# Patient Record
Sex: FEMALE | Race: WHITE | HISPANIC OR LATINO | URBAN - METROPOLITAN AREA
[De-identification: names, ages, dates, MRNs, and addresses within clinical notes are randomized per-mention and may not be internally consistent; named-entity substitution may affect disease eponyms.]

---

## 2021-11-09 ENCOUNTER — OFFICE VISIT (OUTPATIENT)
Dept: URBAN - METROPOLITAN AREA CLINIC 121 | Facility: CLINIC | Age: 71
End: 2021-11-09

## 2021-11-29 ENCOUNTER — OFFICE VISIT (OUTPATIENT)
Dept: URBAN - METROPOLITAN AREA CLINIC 63 | Facility: CLINIC | Age: 71
End: 2021-11-29

## 2021-12-06 ENCOUNTER — OFFICE VISIT (OUTPATIENT)
Dept: URBAN - METROPOLITAN AREA CLINIC 63 | Facility: CLINIC | Age: 71
End: 2021-12-06

## 2021-12-06 ENCOUNTER — OFFICE VISIT (OUTPATIENT)
Dept: URBAN - METROPOLITAN AREA CLINIC 60 | Facility: CLINIC | Age: 71
End: 2021-12-06

## 2022-01-14 ENCOUNTER — OFFICE VISIT (OUTPATIENT)
Dept: URBAN - METROPOLITAN AREA CLINIC 63 | Facility: CLINIC | Age: 72
End: 2022-01-14

## 2022-02-18 ENCOUNTER — OFFICE VISIT (OUTPATIENT)
Dept: URBAN - METROPOLITAN AREA SURGERY CENTER 4 | Facility: SURGERY CENTER | Age: 72
End: 2022-02-18

## 2022-03-22 ENCOUNTER — OFFICE VISIT (OUTPATIENT)
Dept: URBAN - METROPOLITAN AREA SURGERY CENTER 4 | Facility: SURGERY CENTER | Age: 72
End: 2022-03-22

## 2022-05-06 ENCOUNTER — OFFICE VISIT (OUTPATIENT)
Dept: URBAN - METROPOLITAN AREA SURGERY CENTER 4 | Facility: SURGERY CENTER | Age: 72
End: 2022-05-06

## 2022-06-03 ENCOUNTER — OFFICE VISIT (OUTPATIENT)
Dept: URBAN - METROPOLITAN AREA SURGERY CENTER 4 | Facility: SURGERY CENTER | Age: 72
End: 2022-06-03

## 2022-06-07 ENCOUNTER — OFFICE VISIT (OUTPATIENT)
Dept: URBAN - METROPOLITAN AREA MEDICAL CENTER 14 | Facility: MEDICAL CENTER | Age: 72
End: 2022-06-07

## 2022-06-07 ENCOUNTER — OFFICE VISIT (OUTPATIENT)
Dept: URBAN - METROPOLITAN AREA SURGERY CENTER 4 | Facility: SURGERY CENTER | Age: 72
End: 2022-06-07

## 2022-07-09 ENCOUNTER — TELEPHONE ENCOUNTER (OUTPATIENT)
Dept: URBAN - METROPOLITAN AREA CLINIC 121 | Facility: CLINIC | Age: 72
End: 2022-07-09

## 2022-07-09 RX ORDER — ATORVASTATIN CALCIUM 80 MG/1
TABLET, FILM COATED ORAL
Refills: 0 | OUTPATIENT
Start: 2021-12-06 | End: 2022-01-14

## 2022-07-09 RX ORDER — LISINOPRIL 2.5 MG/1
TABLET ORAL
Refills: 0 | OUTPATIENT
Start: 2021-12-06 | End: 2022-01-14

## 2022-07-09 RX ORDER — PANTOPRAZOLE SODIUM 40 MG/1
TABLET, DELAYED RELEASE ORAL
Refills: 0 | OUTPATIENT
Start: 2021-12-06 | End: 2022-01-14

## 2022-07-09 RX ORDER — RIVAROXABAN 2.5 MG/1
TABLET, FILM COATED ORAL TWICE A DAY
Refills: 0 | OUTPATIENT
Start: 2021-12-06 | End: 2022-01-14

## 2022-07-09 RX ORDER — PIOGLITAZONE 15 MG/1
TABLET ORAL
Refills: 0 | OUTPATIENT
Start: 2021-12-06 | End: 2022-01-14

## 2022-07-09 RX ORDER — MECLIZINE HYDROCHLORIDE 25 MG/1
TABLET ORAL TWICE A DAY
Refills: 0 | OUTPATIENT
Start: 2021-12-06 | End: 2022-01-14

## 2022-07-10 ENCOUNTER — TELEPHONE ENCOUNTER (OUTPATIENT)
Dept: URBAN - METROPOLITAN AREA CLINIC 121 | Facility: CLINIC | Age: 72
End: 2022-07-10

## 2022-07-10 RX ORDER — ONDANSETRON HYDROCHLORIDE 4 MG/1
TAKE ONE TABLET PO 30 MINS BEFORE COLONOSCOPY PREP AND ONE TABLET PO 6 HOURS INTO PREP TABLET, FILM COATED ORAL
Refills: 0 | Status: ACTIVE | COMMUNITY
Start: 2022-02-18

## 2022-07-10 RX ORDER — ATORVASTATIN CALCIUM 80 MG/1
TABLET, FILM COATED ORAL
Refills: 0 | Status: ACTIVE | COMMUNITY
Start: 2022-01-14

## 2022-07-10 RX ORDER — MECLIZINE HYDROCHLORIDE 25 MG/1
TABLET ORAL TWICE A DAY
Refills: 0 | Status: ACTIVE | COMMUNITY
Start: 2022-01-14

## 2022-07-10 RX ORDER — POLYETHYLENE GLYCOL 3350, SODIUM SULFATE, SODIUM CHLORIDE, POTASSIUM CHLORIDE, ASCORBIC ACID, SODIUM ASCORBATE 140-9-5.2G
USE AS DIRECTED KIT ORAL
Refills: 0 | Status: ACTIVE | COMMUNITY
Start: 2022-03-10

## 2022-07-10 RX ORDER — PANTOPRAZOLE SODIUM 40 MG/1
TABLET, DELAYED RELEASE ORAL
Refills: 0 | Status: ACTIVE | COMMUNITY
Start: 2022-01-14

## 2022-07-10 RX ORDER — ONDANSETRON HYDROCHLORIDE 4 MG/1
USE AS DIRECTED TAKE ONE TABLET 30 MINUTES BEFORE EACH BOTTLE OF MAGNESIUM CITRATE FOR COLONOSCOPY PREP TABLET, FILM COATED ORAL
Refills: 0 | Status: ACTIVE | COMMUNITY
Start: 2022-06-03

## 2022-07-10 RX ORDER — RIVAROXABAN 2.5 MG/1
TABLET, FILM COATED ORAL TWICE A DAY
Refills: 0 | Status: ACTIVE | COMMUNITY
Start: 2022-01-14

## 2022-07-10 RX ORDER — PIOGLITAZONE 15 MG/1
TABLET ORAL
Refills: 0 | Status: ACTIVE | COMMUNITY
Start: 2022-01-14

## 2022-07-10 RX ORDER — LISINOPRIL 2.5 MG/1
TABLET ORAL
Refills: 0 | Status: ACTIVE | COMMUNITY
Start: 2022-01-14

## 2022-07-14 ENCOUNTER — OFFICE VISIT (OUTPATIENT)
Dept: URBAN - METROPOLITAN AREA CLINIC 63 | Facility: CLINIC | Age: 72
End: 2022-07-14

## 2022-08-07 PROBLEM — 302914006: Status: ACTIVE | Noted: 2022-08-07

## 2022-08-08 ENCOUNTER — DASHBOARD ENCOUNTERS (OUTPATIENT)
Age: 72
End: 2022-08-08

## 2022-08-08 ENCOUNTER — LAB OUTSIDE AN ENCOUNTER (OUTPATIENT)
Dept: URBAN - METROPOLITAN AREA CLINIC 60 | Facility: CLINIC | Age: 72
End: 2022-08-08

## 2022-08-08 ENCOUNTER — OFFICE VISIT (OUTPATIENT)
Dept: URBAN - METROPOLITAN AREA CLINIC 60 | Facility: CLINIC | Age: 72
End: 2022-08-08
Payer: COMMERCIAL

## 2022-08-08 ENCOUNTER — WEB ENCOUNTER (OUTPATIENT)
Dept: URBAN - METROPOLITAN AREA CLINIC 60 | Facility: CLINIC | Age: 72
End: 2022-08-08

## 2022-08-08 VITALS
RESPIRATION RATE: 20 BRPM | BODY MASS INDEX: 28.32 KG/M2 | SYSTOLIC BLOOD PRESSURE: 120 MMHG | DIASTOLIC BLOOD PRESSURE: 78 MMHG | HEIGHT: 61 IN | OXYGEN SATURATION: 98 % | HEART RATE: 89 BPM | WEIGHT: 150 LBS | TEMPERATURE: 97.5 F

## 2022-08-08 DIAGNOSIS — R93.2 ABNORMAL LIVER CT: ICD-10-CM

## 2022-08-08 DIAGNOSIS — B37.81 CANDIDIASIS OF ESOPHAGUS: ICD-10-CM

## 2022-08-08 DIAGNOSIS — Z86.010 PERSONAL HISTORY OF COLONIC POLYPS: ICD-10-CM

## 2022-08-08 DIAGNOSIS — Z80.0 FAMILY HISTORY OF COLON CANCER: ICD-10-CM

## 2022-08-08 DIAGNOSIS — D21.4 SUBMUCOSAL LEIOMYOMA OF COLON: ICD-10-CM

## 2022-08-08 PROCEDURE — 99214 OFFICE O/P EST MOD 30 MIN: CPT | Performed by: PHYSICIAN ASSISTANT

## 2022-08-08 RX ORDER — FLUTICASONE PROPIONATE AND SALMETEROL XINAFOATE 230; 21 UG/1; UG/1
AEROSOL, METERED RESPIRATORY (INHALATION)
Qty: 12 GRAM | Status: ACTIVE | COMMUNITY

## 2022-08-08 RX ORDER — DICLOFENAC SODIUM TOPICAL GEL, 1%, 10 MG/G
GEL TOPICAL
Qty: 300 GRAM | Status: ACTIVE | COMMUNITY

## 2022-08-08 RX ORDER — DONEPEZIL HYDROCHLORIDE 5 MG/1
TAKE 1 TABLET BY MOUTH AT BEDTIME TABLET, FILM COATED ORAL
Qty: 30 EACH | Refills: 1 | Status: ACTIVE | COMMUNITY

## 2022-08-08 RX ORDER — BLOOD-GLUCOSE METER
EACH MISCELLANEOUS
Qty: 100 EACH | Status: ACTIVE | COMMUNITY

## 2022-08-08 RX ORDER — PANTOPRAZOLE SODIUM 40 MG/1
TABLET, DELAYED RELEASE ORAL
Refills: 0 | Status: ACTIVE | COMMUNITY
Start: 2022-01-14

## 2022-08-08 RX ORDER — ACETAMINOPHEN 650 MG/1
TABLET, FILM COATED, EXTENDED RELEASE ORAL
Qty: 360 TABLET | Status: ACTIVE | COMMUNITY

## 2022-08-08 RX ORDER — IPRATROPIUM BROMIDE 0.5 MG/2.5ML
SOLUTION RESPIRATORY (INHALATION)
Qty: 375 MILLILITER | Status: ACTIVE | COMMUNITY

## 2022-08-08 RX ORDER — BLOOD SUGAR DIAGNOSTIC
USE FOR GLUCOSE TESTING 2 TIMES DAILY STRIP MISCELLANEOUS
Qty: 100 EACH | Refills: 6 | Status: ACTIVE | COMMUNITY

## 2022-08-08 RX ORDER — RIVAROXABAN 2.5 MG/1
TABLET, FILM COATED ORAL TWICE A DAY
Refills: 0 | Status: ACTIVE | COMMUNITY
Start: 2022-01-14

## 2022-08-08 RX ORDER — ONDANSETRON HYDROCHLORIDE 4 MG/1
1 TABLET TABLET, FILM COATED ORAL
Qty: 2 | Refills: 0 | OUTPATIENT
Start: 2022-08-08

## 2022-08-08 RX ORDER — FLUCONAZOLE 100 MG/1
USE AS DIRECTED; DAY 1: TAKE TWO TABLETS BY MOUTH ONCE PER DAY; DAY 2-14: ONE TABLET BY MOUTH ONCE PER DAY TABLET ORAL
Qty: 15 TABLET | Refills: 0 | OUTPATIENT
Start: 2022-08-08 | End: 2022-08-22

## 2022-08-08 RX ORDER — ALBUTEROL SULFATE 1.25 MG/3ML
SOLUTION RESPIRATORY (INHALATION)
Qty: 150 MILLILITER | Status: ACTIVE | COMMUNITY

## 2022-08-08 RX ORDER — LISINOPRIL 2.5 MG/1
TABLET ORAL
Refills: 0 | Status: ACTIVE | COMMUNITY
Start: 2022-01-14

## 2022-08-08 RX ORDER — POLYETHYLENE GLYCOL-3350, SODIUM CHLORIDE, POTASSIUM CHLORIDE AND SODIUM BICARBONATE 420; 11.2; 5.72; 1.48 G/438.4G; G/438.4G; G/438.4G; G/438.4G
AS DIRECTED POWDER, FOR SOLUTION ORAL
Qty: 420 MILLILITER | Refills: 0 | OUTPATIENT
Start: 2022-08-08 | End: 2022-08-09

## 2022-08-08 RX ORDER — ATORVASTATIN CALCIUM 80 MG/1
TABLET, FILM COATED ORAL
Refills: 0 | Status: ACTIVE | COMMUNITY
Start: 2022-01-14

## 2022-08-08 NOTE — HPI-TODAY'S VISIT:
Amanda is a pleasant 72-year-old female who presents today for a procedure follow up. Currently on Xarelto. Status post laparoscopic cholecystectomy on 1/3/2022.   Ultrasound dated 1/14/2022 unremarkable  EGD/colonoscopy dated 6/7/2022 demonstrated poor prep.  Nonbleeding internal hemorrhoids.  Diverticulosis in the sigmoid colon.  Several polyps in the ascending colon measuring 4 to 7 mm.  2 of the polyps were removed but the others were unable to be removed.  Limited exam due to patient's body habitus, respiratory status and breathing motion.  Recommended 2-day prep prior to colonoscopy and could consider general anesthesia for colonoscopy.  EGD demonstrated diffuse white plaques consistent with Candida esophagitis involving the entire esophagus.  Irregular Z-line rule out Horton's esophagus.  Small hiatal hernia.  Gastritis with moderate erythema in the antrum and body of the stomach.  Patient started on fluconazole for treatment of Candida esophagitis.  Polyp chronic gastritis is present.  No evidence of H. pylori.  Consistent with reactive chemical gastropathy.  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  Findings are consistent with reflux.  Ascending colon polyps consistent with tubular adenomas   CT abdomen/pelvis with contrast enterography study dated 12/8/2021 demonstrated enhancing polypoid mass arising from the right lateral wall of the rectum approximately 3 cm from the anorectal junction measures at least 1.2 cm. This corresponds to the hypermetabolic finding reported on recent PET/CT and is concerning for primary rectal malignancy. Recommend direct visualization and biopsy. No cause for hypermetabolic activity involving the jejunum as was reported on PET/CT. No evidence of metastatic disease in the abdomen or pelvis    Underwent flexible sigmoidoscopy with hot snare polypectomy and Hemoclip deployment x1 on 1/5/2022 with Dr. Melo. Pedunculated polyp measuring approximately 8 to 9 mm in size status post hot snare polypectomy and Hemoclip x1. Two diminutive rectal polyp status post cold biopsy polypectomy. Pathology showed a smooth muscle spindle cell neoplasm consistent with a leiomyoma. Other polyps consistent with hyperplastic polyps.    Labs dated 1/5/2022 showed a normal hemoglobin. Platelets normal. INR normal. ,  but otherwise normal liver enzymes. Normal renal function. Lipase was normal. Hepatitis panel negative.    MRI/MRCP dated 12/28/2021 showed findings suggestive of acalculous cholecystitis. No gallstones identified. No evidence of choledocholithiasis.    No issues after procedure. Notes 2-3 daily bowel movements without constipation or diarrhea. Denies nausea, vomiting, heartburn, reflux, dysphagia, odynophagia, hematemesis, coffee-ground emesis, abdominal pain, change in bowel habits, abnormal weight loss, BRBPR or melena. Maternal history of colon cancer in her 80s. Three maternal uncles with colon cancer in their 80s.  No other GI complaints at this time

## 2022-08-08 NOTE — PHYSICAL EXAM CHEST:
chest wall non-tender, breathing is unlabored without accessory muscle use, adventitous breath sounds bilaterally, ronchi

## 2022-08-15 ENCOUNTER — LAB OUTSIDE AN ENCOUNTER (OUTPATIENT)
Dept: URBAN - METROPOLITAN AREA CLINIC 60 | Facility: CLINIC | Age: 72
End: 2022-08-15

## 2022-08-16 ENCOUNTER — TELEPHONE ENCOUNTER (OUTPATIENT)
Dept: URBAN - METROPOLITAN AREA CLINIC 63 | Facility: CLINIC | Age: 72
End: 2022-08-16

## 2022-10-25 ENCOUNTER — ERX REFILL RESPONSE (OUTPATIENT)
Dept: URBAN - METROPOLITAN AREA CLINIC 60 | Facility: CLINIC | Age: 72
End: 2022-10-25

## 2022-10-25 RX ORDER — POLYETHYLENE GLYCOL 3350, SODIUM CHLORIDE, SODIUM BICARBONATE AND POTASSIUM CHLORIDE WITH LEMON FLAVOR 420; 11.2; 5.72; 1.48 G/4L; G/4L; G/4L; G/4L
USE AS DIRECTED POWDER, FOR SOLUTION ORAL
Qty: 4000 MILLILITER | Refills: 0 | OUTPATIENT

## 2022-11-28 ENCOUNTER — TELEPHONE ENCOUNTER (OUTPATIENT)
Dept: URBAN - METROPOLITAN AREA CLINIC 63 | Facility: CLINIC | Age: 72
End: 2022-11-28

## 2022-11-28 ENCOUNTER — OFFICE VISIT (OUTPATIENT)
Dept: URBAN - METROPOLITAN AREA MEDICAL CENTER 3 | Facility: MEDICAL CENTER | Age: 72
End: 2022-11-28

## 2022-12-13 ENCOUNTER — OFFICE VISIT (OUTPATIENT)
Dept: URBAN - METROPOLITAN AREA CLINIC 63 | Facility: CLINIC | Age: 72
End: 2022-12-13

## 2023-02-06 ENCOUNTER — OFFICE VISIT (OUTPATIENT)
Dept: URBAN - METROPOLITAN AREA MEDICAL CENTER 3 | Facility: MEDICAL CENTER | Age: 73
End: 2023-02-06
Payer: COMMERCIAL

## 2023-02-06 DIAGNOSIS — D12.2 POLYP OF ASCENDING COLON: ICD-10-CM

## 2023-02-06 DIAGNOSIS — K57.30 DIVERTCULOSIS OF LG INT W/O PERFORATION OR ABSCESS W/O BLEEDING: ICD-10-CM

## 2023-02-06 DIAGNOSIS — D12.0 POLYP OF CECUM: ICD-10-CM

## 2023-02-06 DIAGNOSIS — D12.3 POLYP OF TRANSVERSE COLON: ICD-10-CM

## 2023-02-06 DIAGNOSIS — Z86.010 PERSONAL HISTORY OF COLONIC POLYPS: ICD-10-CM

## 2023-02-06 PROCEDURE — 45380 COLONOSCOPY AND BIOPSY: CPT | Performed by: INTERNAL MEDICINE

## 2023-02-06 PROCEDURE — 45382 COLONOSCOPY W/CONTROL BLEED: CPT | Performed by: INTERNAL MEDICINE

## 2023-02-06 PROCEDURE — 45385 COLONOSCOPY W/LESION REMOVAL: CPT | Performed by: INTERNAL MEDICINE

## 2023-02-06 PROCEDURE — 45381 COLONOSCOPY SUBMUCOUS NJX: CPT | Performed by: INTERNAL MEDICINE

## 2023-02-06 RX ORDER — DONEPEZIL HYDROCHLORIDE 5 MG/1
TAKE 1 TABLET BY MOUTH AT BEDTIME TABLET, FILM COATED ORAL
Qty: 30 EACH | Refills: 1 | Status: ACTIVE | COMMUNITY

## 2023-02-06 RX ORDER — ATORVASTATIN CALCIUM 80 MG/1
TABLET, FILM COATED ORAL
Refills: 0 | Status: ACTIVE | COMMUNITY
Start: 2022-01-14

## 2023-02-06 RX ORDER — PANTOPRAZOLE SODIUM 40 MG/1
TABLET, DELAYED RELEASE ORAL
Refills: 0 | Status: ACTIVE | COMMUNITY
Start: 2022-01-14

## 2023-02-06 RX ORDER — BLOOD-GLUCOSE METER
EACH MISCELLANEOUS
Qty: 100 EACH | Status: ACTIVE | COMMUNITY

## 2023-02-06 RX ORDER — ALBUTEROL SULFATE 1.25 MG/3ML
SOLUTION RESPIRATORY (INHALATION)
Qty: 150 MILLILITER | Status: ACTIVE | COMMUNITY

## 2023-02-06 RX ORDER — ONDANSETRON HYDROCHLORIDE 4 MG/1
1 TABLET TABLET, FILM COATED ORAL
Qty: 2 | Refills: 0 | Status: ACTIVE | COMMUNITY
Start: 2022-08-08

## 2023-02-06 RX ORDER — ACETAMINOPHEN 650 MG/1
TABLET, FILM COATED, EXTENDED RELEASE ORAL
Qty: 360 TABLET | Status: ACTIVE | COMMUNITY

## 2023-02-06 RX ORDER — POLYETHYLENE GLYCOL 3350, SODIUM CHLORIDE, SODIUM BICARBONATE AND POTASSIUM CHLORIDE WITH LEMON FLAVOR 420; 11.2; 5.72; 1.48 G/4L; G/4L; G/4L; G/4L
USE AS DIRECTED POWDER, FOR SOLUTION ORAL
Qty: 4000 MILLILITER | Refills: 0 | Status: ACTIVE | COMMUNITY

## 2023-02-06 RX ORDER — BLOOD SUGAR DIAGNOSTIC
USE FOR GLUCOSE TESTING 2 TIMES DAILY STRIP MISCELLANEOUS
Qty: 100 EACH | Refills: 6 | Status: ACTIVE | COMMUNITY

## 2023-02-06 RX ORDER — RIVAROXABAN 2.5 MG/1
TABLET, FILM COATED ORAL TWICE A DAY
Refills: 0 | Status: ACTIVE | COMMUNITY
Start: 2022-01-14

## 2023-02-06 RX ORDER — DICLOFENAC SODIUM TOPICAL GEL, 1%, 10 MG/G
GEL TOPICAL
Qty: 300 GRAM | Status: ACTIVE | COMMUNITY

## 2023-02-06 RX ORDER — FLUTICASONE PROPIONATE AND SALMETEROL XINAFOATE 230; 21 UG/1; UG/1
AEROSOL, METERED RESPIRATORY (INHALATION)
Qty: 12 GRAM | Status: ACTIVE | COMMUNITY

## 2023-02-06 RX ORDER — LISINOPRIL 2.5 MG/1
TABLET ORAL
Refills: 0 | Status: ACTIVE | COMMUNITY
Start: 2022-01-14

## 2023-02-06 RX ORDER — IPRATROPIUM BROMIDE 0.5 MG/2.5ML
SOLUTION RESPIRATORY (INHALATION)
Qty: 375 MILLILITER | Status: ACTIVE | COMMUNITY

## 2023-02-13 PROBLEM — 428283002: Status: ACTIVE | Noted: 2022-08-07

## 2023-02-13 PROBLEM — 398050005 DIVERTICULAR DISEASE OF COLON: Status: ACTIVE | Noted: 2023-02-13

## 2024-09-12 ENCOUNTER — LAB OUTSIDE AN ENCOUNTER (OUTPATIENT)
Dept: URBAN - METROPOLITAN AREA CLINIC 63 | Facility: CLINIC | Age: 74
End: 2024-09-12

## 2024-09-12 ENCOUNTER — OFFICE VISIT (OUTPATIENT)
Dept: URBAN - METROPOLITAN AREA CLINIC 63 | Facility: CLINIC | Age: 74
End: 2024-09-12
Payer: MEDICARE

## 2024-09-12 VITALS
TEMPERATURE: 97.7 F | OXYGEN SATURATION: 98 % | WEIGHT: 140 LBS | DIASTOLIC BLOOD PRESSURE: 75 MMHG | HEART RATE: 76 BPM | SYSTOLIC BLOOD PRESSURE: 122 MMHG | HEIGHT: 61 IN | BODY MASS INDEX: 26.43 KG/M2

## 2024-09-12 DIAGNOSIS — Z12.11 COLON CANCER SCREENING: ICD-10-CM

## 2024-09-12 DIAGNOSIS — R13.19 OTHER DYSPHAGIA: ICD-10-CM

## 2024-09-12 DIAGNOSIS — K76.0 HEPATIC STEATOSIS: ICD-10-CM

## 2024-09-12 DIAGNOSIS — Z86.010 HISTORY OF COLONIC POLYPS: ICD-10-CM

## 2024-09-12 PROBLEM — 197321007: Status: ACTIVE | Noted: 2024-09-12

## 2024-09-12 PROBLEM — 428283002: Status: ACTIVE | Noted: 2024-09-12

## 2024-09-12 PROCEDURE — 99214 OFFICE O/P EST MOD 30 MIN: CPT

## 2024-09-12 RX ORDER — PIOGLITAZONE 15 MG/1
TOMAR 1 TABLETA POR VIA ORAL A LA HORA DE DORMIR TABLET ORAL
Qty: 100 EACH | Refills: 0 | COMMUNITY

## 2024-09-12 RX ORDER — BLOOD SUGAR DIAGNOSTIC
STRIP MISCELLANEOUS
Qty: 100 EACH | COMMUNITY

## 2024-09-12 RX ORDER — RIVAROXABAN 2.5 MG/1
TABLET, FILM COATED ORAL
Qty: 180 TABLET | COMMUNITY

## 2024-09-12 RX ORDER — HYDROCHLOROTHIAZIDE 25 MG/1
TOMAR 1 TABLETA POR VIA ORAL CADA DIA TABLET ORAL
Qty: 100 EACH | Refills: 0 | COMMUNITY

## 2024-09-12 RX ORDER — TRAZODONE HYDROCHLORIDE 50 MG/1
TOMAR 1 TABLETA POR V?A ORAL CADA NOCHE PARA LA DEPRESI?N TABLET ORAL
Qty: 30 EACH | Refills: 2 | COMMUNITY

## 2024-09-12 RX ORDER — PANTOPRAZOLE SODIUM 40 MG/1
TABLET, DELAYED RELEASE ORAL
Qty: 90 TABLET | COMMUNITY

## 2024-09-12 RX ORDER — LISINOPRIL 10 MG/1
TOMAR 1 TABLETA POR VIA ORAL CADA DIA TABLET ORAL
Qty: 100 EACH | Refills: 1 | COMMUNITY

## 2024-09-12 RX ORDER — FLUTICASONE PROPIONATE AND SALMETEROL XINAFOATE 230; 21 UG/1; UG/1
REALIZAR 2 INHALACIONES POR BOCA 2 VECES AL D?A. USAR ESTE MEDICAMENTO POR 30 D?AS AEROSOL, METERED RESPIRATORY (INHALATION)
Qty: 12 MILLILITER | Refills: 4 | COMMUNITY

## 2024-09-12 RX ORDER — DICLOFENAC SODIUM 10 MG/G
APPLY 1 GRAM TOPICAL FOUR TIMES A DAY AS NEEDED FOR PAIN GEL TOPICAL
Qty: 300 GRAM | Refills: 0 | COMMUNITY

## 2024-09-12 RX ORDER — MECLIZINE HYDROCHLORIDE 25 MG/1
TOMAR 1 TABLETA POR VIA ORAL CADA DIA 2 VECES AL DIA SEGUN SEA NECESARIO TABLET ORAL
Qty: 180 EACH | Refills: 0 | COMMUNITY

## 2024-09-12 RX ORDER — IPRATROPIUM BROMIDE AND ALBUTEROL SULFATE .5; 3 MG/3ML; MG/3ML
SOLUTION RESPIRATORY (INHALATION)
Qty: 90 MILLILITER | COMMUNITY

## 2024-09-12 RX ORDER — GABAPENTIN 600 MG/1
TOMAR 1 TABLETA POR VIA ORAL CADA DIA TABLET, FILM COATED ORAL
Qty: 90 EACH | Refills: 0 | COMMUNITY

## 2024-09-12 RX ORDER — LANCETS 30 GAUGE
USAR PARA CHEKIAR LA AZUCAR EN SANGRE 2 VECES AL DIAS EACH MISCELLANEOUS
Qty: 100 EACH | Refills: 0 | COMMUNITY

## 2024-09-12 RX ORDER — ATORVASTATIN CALCIUM 80 MG/1
TABLET, FILM COATED ORAL
Qty: 100 TABLET | COMMUNITY

## 2024-09-12 NOTE — HPI-TODAY'S VISIT:
This is a pleasant 74-year-old female who presents today for colon cancer screening. Medical history significant for bilateral carotid artery stenosis, GERD, lung nodule, COPD, sleep apnea. She is on CPAP at night. Is not on supplemental oxygen. Status post laparoscopic cholecystectomy on 1/3/2022, bilateral carotid angiography, bilateral tubal ligation. Maternal history of colon cancer in her 80s. Three maternal uncles with colon cancer in their 80s. Currently on Xarelto. Will need to complete procedures in hospital due to cardiac and pulmonary history. Will refer to [facility].  Today, she presents in good state. Reports unremarkable bowel movements. Reports minimal reflux when she eats candy at night. She has been having good efficacy with pantoprazole 40 mg once daily. She does report chronic dysphagia with solids and liquids. She does report choking sensation when she has alot of saliva in her mouth. Denies aspirating events. She does report that she was evaluated by a doctor in [place] for herniated discs many years ago. The doctor had recommended she get surgery done because the herniated discs may lead to issues with swallowing in the future. She reports that she declined surgery at that time, but reports that she understands that it could be related to this.   Currently has no additional GI complaints, questions, concerns. Denies melena, hematochezia, bright red blood per rectum, nausea/vomiting, hematemesis, abdominal pain, constipation/diarrhea/change in bowels, change in stool caliber, unintentional weight loss. Denies a history of stroke, heart attack, pacemaker, defibrillator, stents,seizures.

## 2024-09-12 NOTE — HPI-PREVIOUS IMAGING
8/26/2022 FibroScan - S3, severe steatosis.  F0, no fibrosis or cirrhosis.  Ultrasound dated 1/14/2022 unremarkable  CT abdomen/pelvis with contrast enterography study dated 12/8/2021 demonstrated enhancing polypoid mass arising from the right lateral wall of the rectum approximately 3 cm from the anorectal junction measures at least 1.2 cm. This corresponds to the hypermetabolic finding reported on recent PET/CT and is concerning for primary rectal malignancy. Recommend direct visualization and biopsy. No cause for hypermetabolic activity involving the jejunum as was reported on PET/CT. No evidence of metastatic disease in the abdomen or pelvis  MRI/MRCP dated 12/28/2021 showed findings suggestive of acalculous cholecystitis. No gallstones identified. No evidence of choledocholithiasis.

## 2024-09-12 NOTE — HPI-PREVIOUS LABS
Labs dated 1/5/2022 showed a normal hemoglobin. Platelets normal. INR normal. ,  but otherwise normal liver enzymes. Normal renal function. Lipase was normal. Hepatitis panel negative.

## 2024-09-12 NOTE — HPI-PREVIOUS PROCEDURES
3/4/2023 colonoscopy with Dr. Melo -There were multiple polyps throughout the colon, told 19.   -In the cecum there was  an approximately 7 mm sessile polyp, status post 4 mL of saline injection and hot snare polypectomy with Hemoclip deployment x 2 in light of defect created.   -In the proximal ascending colon there was a diminutive sessile polyp, status cold biopsy polypectomy. - In the mid ascending colon there were 5 sessile polyps measuring from 3 to 5 mm, Status post cold biopsy Polypectomy. - In the hepatic flexure, there were 2 sessile polyps measuring in size from 4 to 7 mm, status cold biopsy polypectomy and also an approximately 6 to 7 mm sessile polyp, status, post cold snare polypectomy. - In the mid transverse colon, there were 5 sessile polyps, diminutive in size, status post cold biopsy polypectomy. - In the mid ascending colon there was a diminutive sessile polyp, status post cold biopsy polypectomy. - In the distal sigmoid colon, there was a diminutive sessile polyp, status cold biopsy polypectomy. - Left-sided diverticulosis coli - Mild to moderate internal hemorrhoids - Repeat colonoscopy in 6 months in light of multitude of polyps and slightly suboptimal prep.  Patient will be done at [facility] under general anesthesia again. -Pathology: Biopsy of mid transverse colon biopsy polyp demonstrates tubular adenoma fragments.  Biopsy of hepatic flexure polyps demonstrates sessile serrated adenoma fragments.  Biopsy of ascending colon polyps demonstrates fragments of tubular adenoma.  Biopsy of ascending proximal colon demonstrates tubular adenoma fragments.  Biopsy of polypectomy of cecum demonstrates 2 fragments of tubular adenoma.  Biopsy of mid descending colon demonstrates colonic mucosa without histopathological diagnosis.  Biopsy of distal sigmoid colon demonstrates tubular adenoma fragments.  EGD/colonoscopy dated 6/7/2022 demonstrated poor prep. Nonbleeding internal hemorrhoids. Diverticulosis in the sigmoid colon. Several polyps in the ascending colon measuring 4 to 7 mm. 2 of the polyps were removed but the others were unable to be removed. Limited exam due to patient's body habitus, respiratory status and breathing motion. Recommended 2-day prep prior to colonoscopy and could consider general anesthesia for colonoscopy. EGD demonstrated diffuse white plaques consistent with Candida esophagitis involving the entire esophagus. Irregular Z-line rule out Horton's esophagus. Small hiatal hernia. Gastritis with moderate erythema in the antrum and body of the stomach. Patient started on fluconazole for treatment of Candida esophagitis. Polyp chronic gastritis is present. No evidence of H. pylori. Consistent with reactive chemical gastropathy. 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 Findings are consistent with reflux. Ascending colon polyps consistent with tubular adenomas  Underwent flexible sigmoidoscopy with hot snare polypectomy and Hemoclip deployment x1 on 1/5/2022 with Dr. Melo. Pedunculated polyp measuring approximately 8 to 9 mm in size status post hot snare polypectomy and Hemoclip x1. Two diminutive rectal polyp status post cold biopsy polypectomy. Pathology showed a smooth muscle spindle cell neoplasm consistent with a leiomyoma. Other polyps consistent with hyperplastic polyps.

## 2024-09-19 ENCOUNTER — LAB OUTSIDE AN ENCOUNTER (OUTPATIENT)
Dept: URBAN - METROPOLITAN AREA CLINIC 63 | Facility: CLINIC | Age: 74
End: 2024-09-19

## 2024-12-10 ENCOUNTER — OFFICE VISIT (OUTPATIENT)
Dept: URBAN - METROPOLITAN AREA SURGERY CENTER 4 | Facility: SURGERY CENTER | Age: 74
End: 2024-12-10

## 2024-12-26 ENCOUNTER — OFFICE VISIT (OUTPATIENT)
Dept: URBAN - METROPOLITAN AREA CLINIC 63 | Facility: CLINIC | Age: 74
End: 2024-12-26

## 2025-01-10 ENCOUNTER — P2P PATIENT RECORD (OUTPATIENT)
Age: 75
End: 2025-01-10